# Patient Record
Sex: FEMALE | Race: WHITE | NOT HISPANIC OR LATINO | Employment: UNEMPLOYED | URBAN - NONMETROPOLITAN AREA
[De-identification: names, ages, dates, MRNs, and addresses within clinical notes are randomized per-mention and may not be internally consistent; named-entity substitution may affect disease eponyms.]

---

## 2019-02-18 ENCOUNTER — OFFICE VISIT (OUTPATIENT)
Dept: OBSTETRICS AND GYNECOLOGY | Facility: CLINIC | Age: 49
End: 2019-02-18

## 2019-02-18 VITALS
DIASTOLIC BLOOD PRESSURE: 72 MMHG | WEIGHT: 139.2 LBS | HEIGHT: 63 IN | BODY MASS INDEX: 24.66 KG/M2 | SYSTOLIC BLOOD PRESSURE: 124 MMHG

## 2019-02-18 DIAGNOSIS — Z87.42 HISTORY OF ABNORMAL CERVICAL PAPANICOLAOU SMEAR: Primary | ICD-10-CM

## 2019-02-18 DIAGNOSIS — N87.0 MILD DYSPLASIA OF CERVIX: ICD-10-CM

## 2019-02-18 PROCEDURE — 99213 OFFICE O/P EST LOW 20 MIN: CPT | Performed by: OBSTETRICS & GYNECOLOGY

## 2019-02-18 PROCEDURE — G0123 SCREEN CERV/VAG THIN LAYER: HCPCS | Performed by: OBSTETRICS & GYNECOLOGY

## 2019-02-18 PROCEDURE — 87624 HPV HI-RISK TYP POOLED RSLT: CPT | Performed by: OBSTETRICS & GYNECOLOGY

## 2019-02-18 RX ORDER — MELOXICAM 15 MG/1
15 TABLET ORAL DAILY
Refills: 0 | COMMUNITY
Start: 2018-12-20

## 2019-02-18 RX ORDER — RANITIDINE 150 MG/1
150 TABLET ORAL DAILY
COMMUNITY

## 2019-02-18 RX ORDER — NAPROXEN 500 MG/1
1 TABLET ORAL AS NEEDED
Refills: 0 | COMMUNITY
Start: 2019-01-13

## 2019-02-18 RX ORDER — ESTRADIOL 0.5 MG/1
1 TABLET ORAL DAILY
Refills: 0 | COMMUNITY
Start: 2019-01-29 | End: 2019-09-19 | Stop reason: SDUPTHER

## 2019-02-18 RX ORDER — MEDROXYPROGESTERONE ACETATE 5 MG/1
1 TABLET ORAL DAILY
Refills: 0 | COMMUNITY
Start: 2019-01-29 | End: 2019-09-19 | Stop reason: SDUPTHER

## 2019-02-20 LAB
GEN CATEG CVX/VAG CYTO-IMP: NORMAL
LAB AP CASE REPORT: NORMAL
LAB AP GYN ADDITIONAL INFORMATION: NORMAL
LAB AP GYN OTHER FINDINGS: NORMAL
PATH INTERP SPEC-IMP: NORMAL
STAT OF ADQ CVX/VAG CYTO-IMP: NORMAL

## 2019-02-22 LAB — HPV I/H RISK 4 DNA CVX QL PROBE+SIG AMP: NEGATIVE

## 2019-03-04 NOTE — PROGRESS NOTES
Shellie Briceño is a 48 y.o. y/o female.     Chief Complaint: Follow-up Pap smear    HPI:   48 y.o. y/o No obstetric history on file..  No LMP recorded. Patient is postmenopausal..  Patient seen in follow-up for LEEP loop mild dysplasia.  Denies any pain increased lymph nodes or any other problems          Review of Systems   ROS:  CNS: No history of brain malignancy  HEENT: No history of throat cancer  Eye: No history of retinal cancer  Pulmonary: No history of lung cancer                                                                                 Cardiovascular: No history of cardiac tumors  Gastrointestinal: No history of small bowel tumors  Renal: No history of kidney  Musculoskeletal: No history of smooth muscle tumors  Lymphatics: No history of of Hodgkin's disease  Endocrine: No history of thyroid malignancy    The following portions of the patient's history were reviewed and updated as appropriate: allergies, current medications, past family history, past medical history, past social history, past surgical history and problem list.    No Known Allergies     Outpatient Medications Prior to Visit   Medication Sig Dispense Refill   • estradiol (ESTRACE) 0.5 MG tablet Take 1 tablet by mouth Daily.  0   • medroxyPROGESTERone (PROVERA) 5 MG tablet Take 1 tablet by mouth Daily.  0   • meloxicam (MOBIC) 15 MG tablet Take 15 mg by mouth Daily.  0   • MICRONIZED COLESTIPOL HCL PO Take 1 Units by mouth Daily.     • naproxen (NAPROSYN) 500 MG tablet Take 1 tablet by mouth As Needed.  0   • raNITIdine (ZANTAC) 150 MG tablet Take 150 mg by mouth Daily.       No facility-administered medications prior to visit.         The patient has a family history of   No family history on file.     No past medical history on file.     OB History     No data available           Social History     Socioeconomic History   • Marital status:      Spouse name: Not on file   • Number of children: Not on file   • Years of education:  "Not on file   • Highest education level: Not on file   Social Needs   • Financial resource strain: Not on file   • Food insecurity - worry: Not on file   • Food insecurity - inability: Not on file   • Transportation needs - medical: Not on file   • Transportation needs - non-medical: Not on file   Occupational History   • Not on file   Tobacco Use   • Smoking status: Heavy Tobacco Smoker   • Smokeless tobacco: Never Used   Substance and Sexual Activity   • Alcohol use: No     Frequency: Never   • Drug use: No   • Sexual activity: Not on file   Other Topics Concern   • Not on file   Social History Narrative   • Not on file        No past surgical history on file.     There is no problem list on file for this patient.       Documented Vitals    02/18/19 1433   BP: 124/72   Weight: 63.1 kg (139 lb 3.2 oz)   Height: 160 cm (63\")   PainSc: 0-No pain        Body mass index is 24.66 kg/m².    Physical Exam  Constitutional: Appears to be in no acute distress; Eyes: sclera normal; Endocrine system: thyroid palpate is normal; Pulmonary system: lungs clear; Cardiovascular system: heart regular rate and rhythm; Gastrointestinal system: abdomen soft nontender, active bowel sounds; Urologic system: CVA negative; Psychiatric: appropriate insight; Neurologic: gait within normal limits    Assessment        Diagnosis Plan   1. History of abnormal cervical Papanicolaou smear  Liquid-based Pap Smear, Screening    HPV DNA Probe, Direct - ThinPrep Vial, Vagina    HPV DNA Probe, Direct - ThinPrep Vial, Cervix   2. Mild dysplasia of cervix           Plan       No orders of the defined types were placed in this encounter.    1. Back in 6 months importance reviewed        This document has been electronically signed by Van Bryson MD on March 4, 2019 7:34 AM    "

## 2019-09-19 ENCOUNTER — OFFICE VISIT (OUTPATIENT)
Dept: OBSTETRICS AND GYNECOLOGY | Facility: CLINIC | Age: 49
End: 2019-09-19

## 2019-09-19 VITALS
WEIGHT: 128 LBS | SYSTOLIC BLOOD PRESSURE: 118 MMHG | HEIGHT: 63 IN | DIASTOLIC BLOOD PRESSURE: 74 MMHG | BODY MASS INDEX: 22.68 KG/M2

## 2019-09-19 DIAGNOSIS — R87.612 LOW GRADE SQUAMOUS INTRAEPITHELIAL LESION ON CYTOLOGIC SMEAR OF CERVIX (LGSIL): Primary | ICD-10-CM

## 2019-09-19 PROCEDURE — 99213 OFFICE O/P EST LOW 20 MIN: CPT | Performed by: OBSTETRICS & GYNECOLOGY

## 2019-09-19 RX ORDER — MEDROXYPROGESTERONE ACETATE 5 MG/1
5 TABLET ORAL DAILY
Qty: 30 TABLET | Refills: 12 | Status: SHIPPED | OUTPATIENT
Start: 2019-09-19 | End: 2020-10-05

## 2019-09-19 RX ORDER — ESTRADIOL 0.5 MG/1
0.5 TABLET ORAL DAILY
Qty: 30 TABLET | Refills: 12 | Status: SHIPPED | OUTPATIENT
Start: 2019-09-19

## 2019-09-19 NOTE — PROGRESS NOTES
Chief complaint history of mildly abnormal Pap smears recurrent.    Patient with history of abnormal Pap smears low-grade with multiple recurrences.  I reviewed her Pap smears in the past at [facility].  I have her most recent cotesting was negative negative.  In light of this I would only recommend cotesting in the year I reviewed things at length with her and answered questions.  No history of high-grade disease. I spent 16 minutes making  more than 50% of this encounter, being spent in counseling and coordination of care.

## 2020-10-05 RX ORDER — MEDROXYPROGESTERONE ACETATE 5 MG/1
TABLET ORAL
Qty: 30 TABLET | Refills: 1 | Status: SHIPPED | OUTPATIENT
Start: 2020-10-05 | End: 2020-11-04

## 2020-10-12 ENCOUNTER — TELEPHONE (OUTPATIENT)
Dept: OBSTETRICS AND GYNECOLOGY | Facility: CLINIC | Age: 50
End: 2020-10-12

## 2020-10-12 NOTE — TELEPHONE ENCOUNTER
I called her to let her know that Dr. Bryson sent in a refill on her medication but she needs to have a pap smear to get more refills. I didn't get an answer and couldn't leave a message.